# Patient Record
Sex: FEMALE | Race: WHITE | NOT HISPANIC OR LATINO | Employment: OTHER | URBAN - METROPOLITAN AREA
[De-identification: names, ages, dates, MRNs, and addresses within clinical notes are randomized per-mention and may not be internally consistent; named-entity substitution may affect disease eponyms.]

---

## 2018-10-04 ENCOUNTER — TELEPHONE (OUTPATIENT)
Dept: GASTROENTEROLOGY | Facility: CLINIC | Age: 68
End: 2018-10-04

## 2018-10-05 ENCOUNTER — TELEPHONE (OUTPATIENT)
Dept: GASTROENTEROLOGY | Facility: CLINIC | Age: 68
End: 2018-10-05

## 2018-10-05 NOTE — TELEPHONE ENCOUNTER
Contacted pt to discuss the scheduled appt on 10/18 with our clinic. Pt stated she was referred by her GI in [place] to see  at [facility] for hernia repair surgery with the mesh, d/t her previous hernia surgery that came undone where it has her stomach pushing up through her chest. Informed pt that  is a general GI physician and does not specialize in hernia surgery with the mesh. Also asked pt if she was currently with the complaint that was listed as diarrhea that was entered as reason for this appt from , pt stated the diarrhea was no longer her issue just needing the hernia repair surgery w/ the mesh. Advised pt to contact her current GI in [place] to advise her to a specialist/surgeon to better provide her healthcare needs. Pt agreed. Pt stated thanks for calling and giving this info before making this trip. Pt stated to cancel the appt on 10/18 with our clinic because it was made incorrectly.

## 2020-10-30 ENCOUNTER — TELEPHONE (OUTPATIENT)
Dept: ENDOSCOPY | Facility: HOSPITAL | Age: 70
End: 2020-10-30

## 2020-10-30 DIAGNOSIS — R10.9 ABDOMINAL PAIN, UNSPECIFIED ABDOMINAL LOCATION: Primary | ICD-10-CM

## 2020-10-30 NOTE — TELEPHONE ENCOUNTER
----- Message from Es Taylor sent at 10/30/2020  1:41 PM CDT -----  Regarding: Gastro Referral  Good Afternoon,    Dr. Israel would like to refer the following patient to the Gastro Department for EUS. I have scanned the patients referral and records into .     Please let me know if I can help schedule in any way.    Thank you,   [facility]

## 2020-11-02 NOTE — TELEPHONE ENCOUNTER
MD Beba Fitzgerald MA   Caller: Unspecified (3 days ago,  4:08 PM)             Ok for egd and eus. It seems she is also referred to bariatric surgery. My procedures are diagnostic and won't hekp with her chronic symptoms.

## 2020-11-04 ENCOUNTER — TELEPHONE (OUTPATIENT)
Dept: ENDOSCOPY | Facility: HOSPITAL | Age: 70
End: 2020-11-04

## 2020-11-04 NOTE — TELEPHONE ENCOUNTER
Spoke with patient. EGD/EUS scheduled for 11/17 at 11a. Reviewed prep instructions. Ms Schmid verbalized understanding.

## 2020-11-10 ENCOUNTER — TELEPHONE (OUTPATIENT)
Dept: ENDOSCOPY | Facility: HOSPITAL | Age: 70
End: 2020-11-10

## 2020-11-10 RX ORDER — AMLODIPINE BESYLATE 10 MG/1
10 TABLET ORAL DAILY
COMMUNITY

## 2020-11-10 RX ORDER — ASPIRIN 81 MG/1
81 TABLET ORAL DAILY
COMMUNITY

## 2020-11-10 RX ORDER — ALENDRONATE SODIUM 35 MG/1
35 TABLET ORAL
COMMUNITY

## 2020-11-10 RX ORDER — IRBESARTAN 300 MG/1
300 TABLET ORAL DAILY
COMMUNITY

## 2020-11-10 RX ORDER — SUCRALFATE 1 G/1
1 TABLET ORAL 3 TIMES DAILY
COMMUNITY

## 2020-11-10 RX ORDER — TRIAMCINOLONE ACETONIDE 1 MG/G
CREAM TOPICAL 2 TIMES DAILY PRN
COMMUNITY

## 2020-11-10 RX ORDER — ERGOCALCIFEROL 1.25 MG/1
50000 CAPSULE ORAL
COMMUNITY
End: 2020-11-10

## 2020-11-10 RX ORDER — LEVOTHYROXINE SODIUM 100 UG/1
100 TABLET ORAL DAILY
COMMUNITY

## 2020-11-10 RX ORDER — BUDESONIDE AND FORMOTEROL FUMARATE DIHYDRATE 160; 4.5 UG/1; UG/1
2 AEROSOL RESPIRATORY (INHALATION) EVERY 12 HOURS PRN
COMMUNITY

## 2020-11-10 RX ORDER — CICLOPIROX 80 MG/ML
SOLUTION TOPICAL NIGHTLY
COMMUNITY

## 2020-11-10 RX ORDER — PROMETHAZINE HYDROCHLORIDE 25 MG/1
25 TABLET ORAL EVERY 6 HOURS PRN
COMMUNITY
End: 2020-11-10

## 2020-11-10 RX ORDER — LATANOPROST 50 UG/ML
1 SOLUTION/ DROPS OPHTHALMIC NIGHTLY
COMMUNITY

## 2020-11-10 RX ORDER — PANTOPRAZOLE SODIUM 40 MG/1
40 TABLET, DELAYED RELEASE ORAL DAILY
COMMUNITY

## 2020-11-10 RX ORDER — MUPIROCIN 20 MG/G
OINTMENT TOPICAL 2 TIMES DAILY PRN
COMMUNITY

## 2020-11-10 RX ORDER — BUPROPION HYDROCHLORIDE 150 MG/1
150 TABLET ORAL DAILY
COMMUNITY
End: 2020-11-10

## 2020-11-10 RX ORDER — ALBUTEROL SULFATE 90 UG/1
2 AEROSOL, METERED RESPIRATORY (INHALATION) EVERY 6 HOURS PRN
COMMUNITY

## 2020-11-10 RX ORDER — FERROUS SULFATE 325(65) MG
325 TABLET ORAL
COMMUNITY

## 2020-11-10 RX ORDER — FLUTICASONE PROPIONATE 50 MCG
2 SPRAY, SUSPENSION (ML) NASAL DAILY PRN
COMMUNITY

## 2020-11-10 RX ORDER — CHOLECALCIFEROL (VITAMIN D3) 10 MCG
800 TABLET ORAL DAILY
COMMUNITY

## 2020-11-10 NOTE — TELEPHONE ENCOUNTER
Spoke with patient about instructions for EGD/UEUS scheduled 11/17/20 at 1100.  Needs rapid covid-19 test at 0830 before procedure; >50 mi from testing sites.

## 2020-11-17 ENCOUNTER — HOSPITAL ENCOUNTER (OUTPATIENT)
Facility: HOSPITAL | Age: 70
Discharge: HOME | End: 2020-11-17
Attending: INTERNAL MEDICINE | Admitting: INTERNAL MEDICINE
Payer: MEDICARE

## 2020-11-17 ENCOUNTER — ANESTHESIA (OUTPATIENT)
Dept: ENDOSCOPY | Facility: HOSPITAL | Age: 70
End: 2020-11-17
Payer: MEDICARE

## 2020-11-17 ENCOUNTER — ANESTHESIA EVENT (OUTPATIENT)
Dept: ENDOSCOPY | Facility: HOSPITAL | Age: 70
End: 2020-11-17
Payer: MEDICARE

## 2020-11-17 VITALS
OXYGEN SATURATION: 100 % | TEMPERATURE: 98 F | HEART RATE: 80 BPM | WEIGHT: 178 LBS | SYSTOLIC BLOOD PRESSURE: 147 MMHG | BODY MASS INDEX: 34.95 KG/M2 | DIASTOLIC BLOOD PRESSURE: 75 MMHG | RESPIRATION RATE: 20 BRPM | HEIGHT: 60 IN

## 2020-11-17 DIAGNOSIS — K31.89 GASTRIC NODULE: Primary | ICD-10-CM

## 2020-11-17 LAB — SARS-COV-2 RDRP RESP QL NAA+PROBE: NEGATIVE

## 2020-11-17 PROCEDURE — D9220A PRA ANESTHESIA: ICD-10-PCS | Mod: ANES,,, | Performed by: ANESTHESIOLOGY

## 2020-11-17 PROCEDURE — 25000003 PHARM REV CODE 250: Performed by: INTERNAL MEDICINE

## 2020-11-17 PROCEDURE — D9220A PRA ANESTHESIA: Mod: ANES,,, | Performed by: ANESTHESIOLOGY

## 2020-11-17 PROCEDURE — U0002 COVID-19 LAB TEST NON-CDC: HCPCS

## 2020-11-17 PROCEDURE — 37000008 HC ANESTHESIA 1ST 15 MINUTES: Performed by: INTERNAL MEDICINE

## 2020-11-17 PROCEDURE — 63600175 PHARM REV CODE 636 W HCPCS: Performed by: NURSE ANESTHETIST, CERTIFIED REGISTERED

## 2020-11-17 PROCEDURE — 37000009 HC ANESTHESIA EA ADD 15 MINS: Performed by: INTERNAL MEDICINE

## 2020-11-17 PROCEDURE — D9220A PRA ANESTHESIA: ICD-10-PCS | Mod: CRNA,,, | Performed by: NURSE ANESTHETIST, CERTIFIED REGISTERED

## 2020-11-17 PROCEDURE — 43259 EGD US EXAM DUODENUM/JEJUNUM: CPT | Mod: ,,, | Performed by: INTERNAL MEDICINE

## 2020-11-17 PROCEDURE — 25000003 PHARM REV CODE 250: Performed by: NURSE ANESTHETIST, CERTIFIED REGISTERED

## 2020-11-17 PROCEDURE — 43259 EGD US EXAM DUODENUM/JEJUNUM: CPT | Performed by: INTERNAL MEDICINE

## 2020-11-17 PROCEDURE — 43259 PR ENDOSCOPIC ULTRASOUND EXAM: ICD-10-PCS | Mod: ,,, | Performed by: INTERNAL MEDICINE

## 2020-11-17 PROCEDURE — D9220A PRA ANESTHESIA: Mod: CRNA,,, | Performed by: NURSE ANESTHETIST, CERTIFIED REGISTERED

## 2020-11-17 RX ORDER — PROPOFOL 10 MG/ML
VIAL (ML) INTRAVENOUS CONTINUOUS PRN
Status: DISCONTINUED | OUTPATIENT
Start: 2020-11-17 | End: 2020-11-17

## 2020-11-17 RX ORDER — LIDOCAINE HYDROCHLORIDE 20 MG/ML
INJECTION INTRAVENOUS
Status: DISCONTINUED | OUTPATIENT
Start: 2020-11-17 | End: 2020-11-17

## 2020-11-17 RX ORDER — PROPOFOL 10 MG/ML
VIAL (ML) INTRAVENOUS
Status: DISCONTINUED | OUTPATIENT
Start: 2020-11-17 | End: 2020-11-17

## 2020-11-17 RX ORDER — SODIUM CHLORIDE 9 MG/ML
INJECTION, SOLUTION INTRAVENOUS CONTINUOUS
Status: DISCONTINUED | OUTPATIENT
Start: 2020-11-17 | End: 2020-11-17 | Stop reason: HOSPADM

## 2020-11-17 RX ORDER — PROPOFOL 10 MG/ML
INJECTION, EMULSION INTRAVENOUS
Status: COMPLETED
Start: 2020-11-17 | End: 2020-11-17

## 2020-11-17 RX ORDER — SODIUM CHLORIDE 0.9 % (FLUSH) 0.9 %
10 SYRINGE (ML) INJECTION
Status: DISCONTINUED | OUTPATIENT
Start: 2020-11-17 | End: 2020-11-17 | Stop reason: HOSPADM

## 2020-11-17 RX ADMIN — SODIUM CHLORIDE: 0.9 INJECTION, SOLUTION INTRAVENOUS at 10:11

## 2020-11-17 RX ADMIN — LIDOCAINE HYDROCHLORIDE 50 MG: 20 INJECTION, SOLUTION INTRAVENOUS at 11:11

## 2020-11-17 RX ADMIN — PROPOFOL 100 MCG/KG/MIN: 10 INJECTION, EMULSION INTRAVENOUS at 11:11

## 2020-11-17 RX ADMIN — PROPOFOL 70 MG: 10 INJECTION, EMULSION INTRAVENOUS at 11:11

## 2020-11-17 RX ADMIN — SODIUM CHLORIDE: 0.9 INJECTION, SOLUTION INTRAVENOUS at 11:11

## 2020-11-17 NOTE — TRANSFER OF CARE
Anesthesia Transfer of Care Note    Patient: Rimma Schmid    Procedure(s) Performed: Procedure(s) (LRB):  ULTRASOUND, UPPER GI TRACT, ENDOSCOPIC (N/A)    Patient location: [facility]    Anesthesia Type: general    Transport from OR: Transported from OR on 2-3 L/min O2 by NC with adequate spontaneous ventilation    Post pain: adequate analgesia    Post assessment: no apparent anesthetic complications and tolerated procedure well    Post vital signs: stable    Level of consciousness: awake and alert    Nausea/Vomiting: no nausea/vomiting    Complications: none    Transfer of care protocol was followed      Last vitals:   Visit Vitals  /71 (BP Location: Left arm)   Pulse 94   Temp 36.8 °C (98.2 °F) (Skin)   Resp 20   Ht 5' (1.524 m)   Wt 80.7 kg (178 lb)   SpO2 100%   Breastfeeding No   BMI 34.76 kg/m²

## 2020-11-17 NOTE — PLAN OF CARE
Patient states they are ready to be discharged. Instruction sgiven to patient.Patient verbalized understanding.Patient denies any pain. MD Thom at bedside to speak with patient prior to discharge. Anesthesia consent and surgical consent in chart upon patient's discharge from [facility].

## 2020-11-17 NOTE — H&P
Short Stay Endoscopy History and Physical    PCP - Primary Doctor No  Referring Physician - James Israel MD  [address] [facility] [address]    Procedure - egd/eus  ASA - per anesthesia  Mallampati - per anesthesia  History of Anesthesia problems - no  Family history Anesthesia problems -  no   Plan of anesthesia - General    HPI:  This is a 69 y.o. female here for evaluation of: gastric lesion    Reflux - no  Dysphagia - no  Abdominal pain - no  Diarrhea - no    ROS:  Constitutional: No fevers, chills, No weight loss  CV: No chest pain  Pulm: No cough, No shortness of breath  Ophtho: No vision changes  GI: see HPI  Derm: No rash    Medical History:  has a past medical history of Anemia, Asthma, C. difficile diarrhea, Colitis, Colon polyps, Diverticulosis of colon, Dyspepsia, Dysphagia, Epigastric pain, Fibrocystic breast, Gastric nodule, Gastric ulcer, Gastrointestinal dysmotility, GERD (gastroesophageal reflux disease), Glaucoma, Hemorrhoids, Hiatal hernia, HTN (hypertension), Hyperlipidemia, Hypothyroidism, IBS (irritable bowel syndrome), Lichen sclerosus et atrophicus of the vulva, Multiple thyroid nodules, Osteopenia, Thyroid disease, and Ulcer of cecum.    Surgical History:  has a past surgical history that includes Colonoscopy w/ polypectomy; Esophagogastroduodenoscopy; Total abdominal hysterectomy w/ bilateral salpingoophorectomy (10/10/2012); Breast biopsy (Left); Cholecystectomy; Colposcopy; Dilation and curettage of uterus; and Tonsillectomy.    Family History: family history includes Breast cancer in her maternal aunt; Cancer in her father; Diabetes in her mother; Heart disease in her mother; Hypertension in her mother..    Social History:  reports that she quit smoking about 23 years ago. Her smoking use included cigarettes. She has a 25.00 pack-year smoking history. She has never used smokeless tobacco. She reports that she does not drink alcohol or use  drugs.    Review of patient's allergies indicates:   Allergen Reactions    Ultram [tramadol] Shortness Of Breath    Epinephrine Palpitations    Lortab [hydrocodone-acetaminophen] Itching       Medications:   Medications Prior to Admission   Medication Sig Dispense Refill Last Dose    alendronate (FOSAMAX) 35 MG tablet Take 35 mg by mouth every 7 days.   Past Week at Unknown time    amLODIPine (NORVASC) 10 MG tablet Take 10 mg by mouth once daily.   11/17/2020 at Unknown time    aspirin (ECOTRIN) 81 MG EC tablet Take 81 mg by mouth once daily.   11/16/2020 at Unknown time    budesonide-formoterol 160-4.5 mcg (SYMBICORT) 160-4.5 mcg/actuation HFAA Inhale 2 puffs into the lungs every 12 (twelve) hours as needed (shortness of breath). Controller    Past Month at Unknown time    cholecalciferol, vitamin D3, capsule/tablet Take 800 Units by mouth once daily.   11/17/2020 at Unknown time    ferrous sulfate (FEOSOL) 325 mg (65 mg iron) Tab tablet Take 325 mg by mouth daily with breakfast.   11/16/2020 at Unknown time    irbesartan (AVAPRO) 300 MG tablet Take 300 mg by mouth once daily.   11/17/2020 at Unknown time    levothyroxine (SYNTHROID) 100 MCG tablet Take 100 mcg by mouth once daily.   11/17/2020 at Unknown time    pantoprazole (PROTONIX) 40 MG tablet Take 40 mg by mouth once daily.   11/17/2020 at Unknown time    sucralfate (CARAFATE) 1 gram tablet Take 1 g by mouth 3 (three) times daily.   11/17/2020 at Unknown time    albuterol (PROVENTIL HFA) 90 mcg/actuation inhaler Inhale 2 puffs into the lungs every 6 (six) hours as needed for Wheezing. Rescue   More than a month at Unknown time    ciclopirox (PENLAC) 8 % Soln Apply topically nightly. Apply to affected nails.   Unknown at Unknown time    dextromethorphan-guaifenesin  mg (MUCINEX DM)  mg per 12 hr tablet Take 1 tablet by mouth 2 (two) times daily as needed (cough).   More than a month at Unknown time    fluticasone propionate  (FLONASE) 50 mcg/actuation nasal spray 2 sprays by Each Nostril route daily as needed for Rhinitis or Allergies.    More than a month at Unknown time    latanoprost 0.005 % ophthalmic solution Place 1 drop into both eyes every evening.   Unknown at Unknown time    mupirocin (BACTROBAN) 2 % ointment Apply topically 2 (two) times daily as needed (for sore in nose).   Unknown at Unknown time    triamcinolone acetonide 0.1% (KENALOG) 0.1 % cream Apply topically 2 (two) times daily as needed (skin rash).   Unknown at Unknown time       Physical Exam:    Vital Signs:   Vitals:    11/17/20 1041   BP: (!) 147/70   Pulse: 74   Resp: 17   Temp: 97.3 °F (36.3 °C)       General Appearance: Well appearing in no acute distress    Labs:  No results found for: WBC, HGB, HCT, PLT, CHOL, TRIG, HDL, LDLDIRECT, ALT, AST, NA, K, CL, CREATININE, BUN, CO2, TSH, PSA, INR, GLUF, HGBA1C, MICROALBUR    I have explained the risks and benefits of this endoscopic procedure to the patient including but not limited to bleeding, inflammation, infection, perforation, and death.      Aditya Tomas MD

## 2020-11-17 NOTE — ANESTHESIA PREPROCEDURE EVALUATION
11/17/2020  Rimma Schmid is a 69 y.o., female.  There is no problem list on file for this patient.    Past Surgical History:   Procedure Laterality Date    BREAST BIOPSY Left     CHOLECYSTECTOMY      COLONOSCOPY W/ POLYPECTOMY      COLPOSCOPY      DILATION AND CURETTAGE OF UTERUS      ESOPHAGOGASTRODUODENOSCOPY      TONSILLECTOMY      TOTAL ABDOMINAL HYSTERECTOMY W/ BILATERAL SALPINGOOPHORECTOMY  10/10/2012     Past Medical History:   Diagnosis Date    Anemia     Asthma     C. difficile diarrhea     Colitis     Colon polyps     Diverticulosis of colon     Dyspepsia     Dysphagia     Epigastric pain     Fibrocystic breast     Gastric nodule     Gastric ulcer     Gastrointestinal dysmotility     GERD (gastroesophageal reflux disease)     Glaucoma     Hemorrhoids     Hiatal hernia     HTN (hypertension)     Hyperlipidemia     Hypothyroidism     IBS (irritable bowel syndrome)     Lichen sclerosus et atrophicus of the vulva     Multiple thyroid nodules     Osteopenia     Thyroid disease     Ulcer of cecum        Anesthesia Evaluation    I have reviewed the Patient Summary Reports.    I have reviewed the Nursing Notes. I have reviewed the NPO Status.      Review of Systems      Physical Exam  General:  Well nourished    Airway/Jaw/Neck:  Airway Findings: Mouth Opening: Normal General Airway Assessment: Adult  Mallampati: II  Improves to II with phonation.  Jaw/Neck Findings:  Limited Ability to Prognath  Neck ROM: Normal ROM     Eyes/Ears/Nose:  Eyes/Ears/Nose Findings:    Dental:  Dental Findings: In tact   Chest/Lungs:  Chest/Lungs Findings: Clear to auscultation, Normal Respiratory Rate     Heart/Vascular:  Heart Findings: Rate: Normal  Rhythm: Regular Rhythm  Sounds: Normal     Abdomen:  Abdomen Findings:  Normal     Musculoskeletal:  Musculoskeletal Findings:    Skin:  Skin  Findings:     Mental Status:  Mental Status Findings:  Cooperative, Alert and Oriented         Anesthesia Plan  Type of Anesthesia, risks & benefits discussed:  Anesthesia Type:  general, MAC  Patient's Preference:   Intra-op Monitoring Plan:   Intra-op Monitoring Plan Comments:   Post Op Pain Control Plan:   Post Op Pain Control Plan Comments:   Induction:   IV  Beta Blocker:  Patient is not currently on a Beta-Blocker (No further documentation required).       Informed Consent: Patient understands risks and agrees with Anesthesia plan.  Questions answered. Anesthesia consent signed with patient.  ASA Score: 3     Day of Surgery Review of History & Physical:    H&P update referred to the surgeon.         Ready For Surgery From Anesthesia Perspective.

## 2020-11-17 NOTE — [employer] PATIENT INSTRUCTIONS
Discharge Summary/Instructions after an Endoscopic Procedure  Patient Name: Rimma Schmid  Patient MRN: [id number]  Patient YOB: 1950 Tuesday, November 17, 2020  Aditya Tomas MD  RESTRICTIONS:  During your procedure today, you received medications for sedation.  These   medications may affect your judgment, balance and coordination.  Therefore,   for 24 hours, you have the following restrictions:   - DO NOT drive a car, operate machinery, make legal/financial decisions,   sign important papers or drink alcohol.    ACTIVITY:  Today: no heavy lifting, straining or running due to procedural   sedation/anesthesia.  The following day: return to full activity including work.  DIET:  Eat and drink normally unless instructed otherwise.     TREATMENT FOR COMMON SIDE EFFECTS:  - Mild abdominal pain, nausea, belching, bloating or excessive gas:  rest,   eat lightly and use a heating pad.  - Sore Throat: treat with throat lozenges and/or gargle with warm salt   water.  - Because air was used during the procedure, expelling large amounts of air   from your rectum or belching is normal.  - If a bowel prep was taken, you may not have a bowel movement for 1-3 days.    This is normal.  SYMPTOMS TO WATCH FOR AND REPORT TO YOUR PHYSICIAN:  1. Abdominal pain or bloating, other than gas cramps.  2. Chest pain.  3. Back pain.  4. Signs of infection such as: chills or fever occurring within 24 hours   after the procedure.  5. Rectal bleeding, which would show as bright red, maroon, or black stools.   (A tablespoon of blood from the rectum is not serious, especially if   hemorrhoids are present.)  6. Vomiting.  7. Weakness or dizziness.  GO DIRECTLY TO THE NEAREST EMERGENCY ROOM IF YOU HAVE ANY OF THE FOLLOWING:      Difficulty breathing              Chills and/or fever over 101 F   Persistent vomiting and/or vomiting blood   Severe abdominal pain   Severe chest pain   Black, tarry stools   Bleeding- more than one  tablespoon   Any other symptom or condition that you feel may need urgent attention  Your doctor recommends these additional instructions:  If any biopsies were taken, your doctors clinic will contact you in 1 to 2   weeks with any results.  - Discharge patient to home.   - Resume previous diet.   - Continue present medications.   - Return to referring physician at appointment to be scheduled.  For questions, problems or results please call your physician - Aditya Tomas MD at Work:  [phone].  [facility] [place], EMERGENCY ROOM PHONE NUMBER: [phone]  IF A COMPLICATION OR EMERGENCY SITUATION ARISES AND YOU ARE UNABLE TO REACH   YOUR PHYSICIAN - GO DIRECTLY TO THE EMERGENCY ROOM.  Aditya Tomas MD  11/17/2020 12:09:12 PM  This report has been verified and signed electronically.  [employer]

## 2020-11-18 NOTE — ANESTHESIA POSTPROCEDURE EVALUATION
Anesthesia Post Evaluation    Patient: Rimma Schmid    Procedure(s) Performed: Procedure(s) (LRB):  ULTRASOUND, UPPER GI TRACT, ENDOSCOPIC (N/A)    Final Anesthesia Type: general    Patient location during evaluation: PACU  Patient participation: Yes- Able to Participate  Level of consciousness: awake and alert and oriented  Pain management: adequate  Airway patency: patent    PONV status at discharge: No PONV  Anesthetic complications: no      Cardiovascular status: blood pressure returned to baseline and hemodynamically stable  Respiratory status: unassisted  Hydration status: euvolemic  Follow-up not needed.          Vitals Value Taken Time   /75 11/17/20 1245   Temp 36.7 °C (98.1 °F) 11/17/20 1245   Pulse 80 11/17/20 1245   Resp 20 11/17/20 1245   SpO2 100 % 11/17/20 1245         No case tracking events are documented in the log.      Pain/Shun Score: Shun Score: 10 (11/17/2020 12:20 PM)